# Patient Record
Sex: MALE | Race: WHITE | NOT HISPANIC OR LATINO | URBAN - METROPOLITAN AREA
[De-identification: names, ages, dates, MRNs, and addresses within clinical notes are randomized per-mention and may not be internally consistent; named-entity substitution may affect disease eponyms.]

---

## 2019-07-22 ENCOUNTER — EMERGENCY (EMERGENCY)
Facility: HOSPITAL | Age: 29
LOS: 1 days | Discharge: HOME | End: 2019-07-22
Attending: EMERGENCY MEDICINE
Payer: SELF-PAY

## 2019-07-22 VITALS
WEIGHT: 179.9 LBS | HEART RATE: 112 BPM | DIASTOLIC BLOOD PRESSURE: 82 MMHG | TEMPERATURE: 98 F | HEIGHT: 69 IN | SYSTOLIC BLOOD PRESSURE: 136 MMHG | OXYGEN SATURATION: 98 % | RESPIRATION RATE: 18 BRPM

## 2019-07-22 PROCEDURE — 99053 MED SERV 10PM-8AM 24 HR FAC: CPT

## 2019-07-22 PROCEDURE — 99282 EMERGENCY DEPT VISIT SF MDM: CPT

## 2019-07-22 PROCEDURE — 99285 EMERGENCY DEPT VISIT HI MDM: CPT

## 2019-07-22 NOTE — ED PROVIDER NOTE - OBJECTIVE STATEMENT
29 y/o male with no significant PMHx brought in by EMS due to EtOH intoxication. Pt states that he had been drinking EtOH and stumbled and fell on the street. Pt denies any head trauma, LOC, HA, pain, or other complaints. Pt states that a bystander called police and he was brought to the ED for evaluation. Pt reports that he is currently asymptomatic and wants to go home. Pt states that he will call an Uber to go home from the ED.

## 2019-07-22 NOTE — ED PROVIDER NOTE - CLINICAL SUMMARY MEDICAL DECISION MAKING FREE TEXT BOX
29 y/o male brought in by EMS due to fall and being found EtOH intoxicated in the street. Pt is clinically sober in ED; stable for discharge. 29 y/o male brought in by EMS due to fall and being found EtOH intoxicated in the street. Pt is clinically sober in ED; stable for discharge.  patient walked out without DC instructions